# Patient Record
(demographics unavailable — no encounter records)

---

## 2024-11-12 NOTE — HISTORY OF PRESENT ILLNESS
[FreeTextEntry1] : Recent colonoscopy revealed 2 small polyps which were removed and were benign.  She is feeling well and has no complaints except for some hemorrhoidal discomfort.  She denies constipation or diarrhea. She denies a family history of colon cancer

## 2024-11-12 NOTE — CONSULT LETTER
[Dear  ___] : Dear  [unfilled], [Consult Letter:] : I had the pleasure of evaluating your patient, [unfilled]. [( Thank you for referring [unfilled] for consultation for _____ )] : Thank you for referring [unfilled] for consultation for [unfilled] [Please see my note below.] : Please see my note below. [Consult Closing:] : Thank you very much for allowing me to participate in the care of this patient.  If you have any questions, please do not hesitate to contact me. [Sincerely,] : Sincerely, [FreeTextEntry3] : Jesús Hancock MD  [facility]enterology [facility]

## 2024-11-12 NOTE — ASSESSMENT
[FreeTextEntry1] : High-fiber diet I prescribed Proctozone 2.5% hemorrhoidal cream  Repeat colonoscopy in 7 years   I spent 23 minutes with the patient and answered all of her question

## 2025-03-11 NOTE — HISTORY OF PRESENT ILLNESS
[de-identified] : TREASURE ESTEVES  is a 60 year old woman who presents to the [facility] with a chief complaint of throat pain.   She is referred by ~  ~.   They have had pain in their throat for ~  ~ months. They ~  ~ a prior CT neck. They ~  ~ prior smoking history. They ~  ~ prior history of alcoholism/alcohol abuse. Throat pain is ~  ~ when swallowing solids or liquids. They ~  ~ weight loss in the past 3 months. They ~  ~ altered their diet because of this pain. They ~  ~ frequent ear pain. Pain is ~  ~ when chewing. They ~  ~ a prior swallow study in the past 1 year. They have seen the following types of providers for this problem: ~  ~. They have taken the following medications for this problem: ~  ~ Doing or taking the following makes the pain better: ~  ~ Doing the following makes the pain worse: ~  ~